# Patient Record
Sex: FEMALE | Race: WHITE
[De-identification: names, ages, dates, MRNs, and addresses within clinical notes are randomized per-mention and may not be internally consistent; named-entity substitution may affect disease eponyms.]

---

## 2016-10-21 VITALS — DIASTOLIC BLOOD PRESSURE: 61 MMHG | SYSTOLIC BLOOD PRESSURE: 126 MMHG

## 2017-01-17 ENCOUNTER — HOSPITAL ENCOUNTER (OUTPATIENT)
Dept: HOSPITAL 61 - US | Age: 22
Discharge: HOME | End: 2017-01-17
Attending: PEDIATRICS
Payer: COMMERCIAL

## 2017-01-17 DIAGNOSIS — R10.11: Primary | ICD-10-CM

## 2017-01-17 PROCEDURE — 76700 US EXAM ABDOM COMPLETE: CPT

## 2017-01-17 NOTE — RAD
Indication:Right upper quadrant abdominal pain



Grayscale images of the abdomen were obtained.



Comparison note is made of a prior examination 6/3/2015 reported as

unremarkable and a right upper quadrant abdominal ultrasound examination

8/28/2015 also reported as unremarkable



Liver:Normal.

Gallbladder:Normal. The common bile duct diameter of approximately 3 mm is

also normal.

Spleen:Normal

Pancreas:As visualized normal.

Kidneys:Normal.

Abdominal aorta and IVC:Normal

Ancillary findings:None



Impression:Normal abdominal ultrasound exam

## 2018-02-22 ENCOUNTER — HOSPITAL ENCOUNTER (EMERGENCY)
Dept: HOSPITAL 61 - ER | Age: 23
Discharge: HOME | End: 2018-02-22
Payer: COMMERCIAL
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
## 2018-02-22 DIAGNOSIS — Z91.041: ICD-10-CM

## 2018-02-22 DIAGNOSIS — S06.0X0D: Primary | ICD-10-CM

## 2018-02-22 PROCEDURE — 99284 EMERGENCY DEPT VISIT MOD MDM: CPT

## 2018-02-22 PROCEDURE — 70450 CT HEAD/BRAIN W/O DYE: CPT

## 2018-02-22 RX ADMIN — ONDANSETRON 1 MG: 4 TABLET, ORALLY DISINTEGRATING ORAL at 16:05

## 2020-02-10 ENCOUNTER — HOSPITAL ENCOUNTER (INPATIENT)
Dept: HOSPITAL 61 - ER | Age: 25
LOS: 1 days | Discharge: HOME | DRG: 125 | End: 2020-02-11
Attending: INTERNAL MEDICINE | Admitting: INTERNAL MEDICINE
Payer: COMMERCIAL

## 2020-02-10 VITALS — BODY MASS INDEX: 23.04 KG/M2 | HEIGHT: 66.5 IN | WEIGHT: 145.06 LBS

## 2020-02-10 DIAGNOSIS — F41.9: ICD-10-CM

## 2020-02-10 DIAGNOSIS — Z88.8: ICD-10-CM

## 2020-02-10 DIAGNOSIS — M25.50: ICD-10-CM

## 2020-02-10 DIAGNOSIS — G43.909: ICD-10-CM

## 2020-02-10 DIAGNOSIS — F32.9: ICD-10-CM

## 2020-02-10 DIAGNOSIS — H54.61: Primary | ICD-10-CM

## 2020-02-10 DIAGNOSIS — Q51.0: ICD-10-CM

## 2020-02-10 DIAGNOSIS — G35: ICD-10-CM

## 2020-02-10 DIAGNOSIS — Z82.49: ICD-10-CM

## 2020-02-10 LAB
ALBUMIN SERPL-MCNC: 3.8 G/DL (ref 3.4–5)
ALBUMIN/GLOB SERPL: 1.1 {RATIO} (ref 1–1.7)
ALP SERPL-CCNC: 97 U/L (ref 46–116)
ALT SERPL-CCNC: 29 U/L (ref 14–59)
ANION GAP SERPL CALC-SCNC: 10 MMOL/L (ref 6–14)
ANISOCYTOSIS BLD QL SMEAR: SLIGHT
AST SERPL-CCNC: 21 U/L (ref 15–37)
BASOPHILS # BLD AUTO: 0 X10^3/UL (ref 0–0.2)
BASOPHILS NFR BLD: 1 % (ref 0–3)
BILIRUB SERPL-MCNC: 0.2 MG/DL (ref 0.2–1)
BUN SERPL-MCNC: 19 MG/DL (ref 7–20)
BUN/CREAT SERPL: 24 (ref 6–20)
CALCIUM SERPL-MCNC: 9.6 MG/DL (ref 8.5–10.1)
CHLORIDE SERPL-SCNC: 103 MMOL/L (ref 98–107)
CO2 SERPL-SCNC: 27 MMOL/L (ref 21–32)
CREAT SERPL-MCNC: 0.8 MG/DL (ref 0.6–1)
CRP SERPL-MCNC: 4.2 MG/L (ref 0–3.3)
DACRYOCYTES BLD QL SMEAR: (no result)
EOSINOPHIL NFR BLD: 0.2 X10^3/UL (ref 0–0.7)
EOSINOPHIL NFR BLD: 3 % (ref 0–3)
ERYTHROCYTE [DISTWIDTH] IN BLOOD BY AUTOMATED COUNT: 13.6 % (ref 11.5–14.5)
GFR SERPLBLD BASED ON 1.73 SQ M-ARVRAT: 88.9 ML/MIN
GLOBULIN SER-MCNC: 3.5 G/DL (ref 2.2–3.8)
GLUCOSE SERPL-MCNC: 95 MG/DL (ref 70–99)
HCT VFR BLD CALC: 38.7 % (ref 36–47)
HGB BLD-MCNC: 12.8 G/DL (ref 12–15.5)
LYMPHOCYTES # BLD: 1.3 X10^3/UL (ref 1–4.8)
LYMPHOCYTES NFR BLD AUTO: 28 % (ref 24–48)
MCH RBC QN AUTO: 28 PG (ref 25–35)
MCHC RBC AUTO-ENTMCNC: 33 G/DL (ref 31–37)
MCV RBC AUTO: 84 FL (ref 79–100)
MONO #: 0.5 X10^3/UL (ref 0–1.1)
MONOCYTES NFR BLD: 10 % (ref 0–9)
NEUT #: 2.8 X10^3/UL (ref 1.8–7.7)
NEUTROPHILS NFR BLD AUTO: 58 % (ref 31–73)
PLATELET # BLD AUTO: 89 X10^3/UL (ref 140–400)
PLATELET # BLD EST: (no result) 10*3/UL
POTASSIUM SERPL-SCNC: 3.5 MMOL/L (ref 3.5–5.1)
PREG TEST PT QUAL: NEGATIVE
PROT SERPL-MCNC: 7.3 G/DL (ref 6.4–8.2)
RBC # BLD AUTO: 4.61 X10^6/UL (ref 3.5–5.4)
SODIUM SERPL-SCNC: 140 MMOL/L (ref 136–145)
WBC # BLD AUTO: 4.8 X10^3/UL (ref 4–11)

## 2020-02-10 PROCEDURE — 82607 VITAMIN B-12: CPT

## 2020-02-10 PROCEDURE — 80053 COMPREHEN METABOLIC PANEL: CPT

## 2020-02-10 PROCEDURE — 96360 HYDRATION IV INFUSION INIT: CPT

## 2020-02-10 PROCEDURE — 93005 ELECTROCARDIOGRAM TRACING: CPT

## 2020-02-10 PROCEDURE — 85379 FIBRIN DEGRADATION QUANT: CPT

## 2020-02-10 PROCEDURE — 70496 CT ANGIOGRAPHY HEAD: CPT

## 2020-02-10 PROCEDURE — 86038 ANTINUCLEAR ANTIBODIES: CPT

## 2020-02-10 PROCEDURE — 84484 ASSAY OF TROPONIN QUANT: CPT

## 2020-02-10 PROCEDURE — 85025 COMPLETE CBC W/AUTO DIFF WBC: CPT

## 2020-02-10 PROCEDURE — 70553 MRI BRAIN STEM W/O & W/DYE: CPT

## 2020-02-10 PROCEDURE — 86431 RHEUMATOID FACTOR QUANT: CPT

## 2020-02-10 PROCEDURE — 36415 COLL VENOUS BLD VENIPUNCTURE: CPT

## 2020-02-10 PROCEDURE — 70498 CT ANGIOGRAPHY NECK: CPT

## 2020-02-10 PROCEDURE — 84443 ASSAY THYROID STIM HORMONE: CPT

## 2020-02-10 PROCEDURE — 84703 CHORIONIC GONADOTROPIN ASSAY: CPT

## 2020-02-10 PROCEDURE — 86140 C-REACTIVE PROTEIN: CPT

## 2020-02-10 PROCEDURE — 71275 CT ANGIOGRAPHY CHEST: CPT

## 2020-02-10 PROCEDURE — G0378 HOSPITAL OBSERVATION PER HR: HCPCS

## 2020-02-10 NOTE — RAD
STUDY: CT angiography of the head and neck

 

INDICATION: Loss of vision.

 

COMPARISON: Correlation is made to the CT of the head from 2/22/2018

 

TECHNIQUE: Axial CT imaging of the head and neck utilizing angiography 

protocol and performed after the intravenous administration of 75 cc 

Omnipaque 300 contrast. Multiplanar reformats and 3D MIP acquisitions were

obtained. Encountered areas of stenosis are measured per NASCET criteria.

 

One or more of the following individualized dose reduction techniques were

utilized for this examination:  

 

1. Automated exposure control

2. Adjustment of the mA and/or kV according to patient size

3. Use of iterative reconstruction technique.

 

FINDINGS:

 

CTA NECK:

 

Arch/Proximal Great Vessels: The aortic arch and visualized ascending and 

descending aorta are normal. 3 vessel arch with widely patent origins. The

visualized subclavian and axillary arteries are normal. Widely patent 

common carotid arteries.

 

Carotid Bifurcation/Cervical ICA: Widely patent extracranial internal 

carotid arteries. Unremarkable external carotid artery branches.

 

Vertebral Arteries: Patent vertebral artery origins. The vertebral 

arteries are codominant and without flow-limiting stenosis or dissection 

throughout their extracranial course.

 

CTA HEAD:

 

Posterior Circulation: The right intracranial vertebral artery becomes 

small in caliber after the origin of the posterior inferior cerebellar 

artery but remains patent and this configuration is favored developmental.

The intracranial left vertebral artery is widely patent. The basilar 

artery is patent. Hypoplastic P1 posterior cerebral artery segments, more 

noticeable on the left, in the setting of bilateral widely patent 

posterior communicating arteries. No branch vessel occlusion or 

flow-limiting stenosis seen to involve the bilateral posterior cerebral 

arteries distal to P1.

 

Anterior Circulation: Normal intracranial carotid arteries. No 

flow-limiting stenosis, branch vessel occlusion or aneurysm seen to 

involve the anterior circulation. Variant anterior cerebral artery anatomy

with a patent anterior communicating artery but predominant supply of the 

right and left anterior cerebral artery branches distal to A1 by a 

dominant left A2 and beyond anterior cerebral artery vessel. Ophthalmic 

arteries remain opacified bilaterally.

 

Veins: Patent dural sinuses and major intracerebral veins.

 

MISCELLANEOUS:

 

Unremarkable orbits no abnormality involving the visualized lungs. 

Unremarkable cervical spine.

 

IMPRESSION: 

 

CT Angio Neck:

1.  The visualized aorta is normal. Widely patent carotid and vertebral 

arteries throughout their extracranial course.

 

CT Angio Head:

1. No flow-limiting stenosis or branch vessel occlusion involving the 

anterior or posterior cerebral circulations. Patent dural sinuses and 

major intracerebral veins. Collectively, no abnormality is seen by CT 

angiography to account for the patient's vision loss.

 

Electronically signed by: SULTANA CHOW MD (2/10/2020 9:12 PM) [id number]

## 2020-02-10 NOTE — RAD
Exam: CT of chest with contrast

 

INDICATION: Loss of vision

 

TECHNIQUE: Sequential axial images through the chest obtained following 

the administration 90 mL of Omni 350 IV contrast. Sagittal and coronal 

reformatted images were reconstructed from the axial data and reviewed. 

3-D reformatted images were reconstructed from the axial data and 

reviewed.

 

Comparisons: None

 

FINDINGS:

Visualized portions of the thyroid are unremarkable. No enlarged 

mediastinal lymph nodes.

 

Heart size is normal. No pericardial effusion. Thoracic aorta has a normal

course and caliber. Pulmonary artery is not enlarged.

 

Airways are patent. No consolidation or pneumothorax. No suspicious lung 

nodules are identified.

 

No pleural effusion or thickening.

 

Visualized upper abdomen is unremarkable.

 

No suspicious osseous lesions or acute fracture. Sternotomy wires are 

noted.

 

IMPRESSION:

Thoracic aorta has a normal course and caliber without evidence of 

dissection, aneurysm or intramural hematoma.

 

Exposure: One or more of the following in the visualized dose reduction 

techniques were utilized for this examination:

1.  Automated exposure control

2.  Adjustment of the MA and/or KV according to patient size

3.  Use of iterative of reconstructive technique

 

Electronically signed by: Lizbeth Valle MD (2/10/2020 10:05 PM) [id number]

## 2020-02-11 VITALS — DIASTOLIC BLOOD PRESSURE: 73 MMHG | SYSTOLIC BLOOD PRESSURE: 114 MMHG

## 2020-02-11 VITALS — DIASTOLIC BLOOD PRESSURE: 62 MMHG | SYSTOLIC BLOOD PRESSURE: 105 MMHG

## 2020-02-11 VITALS — DIASTOLIC BLOOD PRESSURE: 54 MMHG | SYSTOLIC BLOOD PRESSURE: 99 MMHG

## 2020-02-11 LAB — RHEUMATOID FACT SERPL-ACNC: 322.6 IU/ML (ref 0–13.9)

## 2020-02-11 RX ADMIN — MORPHINE SULFATE PRN MG: 2 INJECTION, SOLUTION INTRAMUSCULAR; INTRAVENOUS at 03:07

## 2020-02-11 RX ADMIN — MORPHINE SULFATE PRN MG: 2 INJECTION, SOLUTION INTRAMUSCULAR; INTRAVENOUS at 08:00

## 2020-02-11 NOTE — PDOC2
NEUROLOGY CONSULT


Date of Admission


Date of Admission


DATE: 2/11/20 


TIME: 10:14





Reason for Consult


Reason for Consult:


Vision loss





Referring Physician


Referring Physician:


Dr. Luana Clement





Source


Source:  Caregiver, Chart review, Patient





History of Present Illness


History of Present Illness


The patient is a 24-year-old right-handed female who noticed yesterday the onset

of blurred vision just in the lateral field of the right eye. She has had no 

loss of vision a left eye. She does have a history of migraines in the past but 

not currently. She denies any head pain including with eye movement. She is 

having chest pain, which she has had in the past and has had negative workup 

for. She has had poly arthralgia for the last several months and has difficulty 

getting around. She is scheduled to see her eye doctor today. There is no histor

y of stroke, seizure, or head injury.She denies any psychosocial stress.





Past Medical History


Cardiovascular:  Valve insufficiency


Psych:  Anxiety, Depression


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





Past Surgical History


Past Surgical History:  Other (Mitral valve repair as an infant, strabismus 

surgery)





Family History


Family History:  No pertinent hx





Social History


Social History


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





Current Medications


Current Medications





Current Medications


Iohexol (Omnipaque 300 Mg/ml) 75 ml 1X  ONCE IV  Last administered on 2/10/20at 

21:26;  Start 2/10/20 at 21:00;  Stop 2/10/20 at 21:02;  Status DC


Info (CONTRAST GIVEN -- Rx MONITORING) 1 each PRN DAILY  PRN MC SEE COMMENTS;  

Start 2/10/20 at 21:15;  Stop 2/12/20 at 21:14


Sodium Chloride 1,000 ml @  1,000 mls/hr 1X  ONCE IV  Last administered on 

2/10/20at 22:19;  Start 2/10/20 at 21:30;  Stop 2/10/20 at 22:29;  Status DC


Iohexol (Omnipaque 350 Mg/ml) 90 ml 1X  ONCE IV  Last administered on 2/10/20at 

22:00;  Start 2/10/20 at 21:45;  Stop 2/10/20 at 21:47;  Status DC


Info (CONTRAST GIVEN -- Rx MONITORING) 1 each PRN DAILY  PRN MC SEE COMMENTS;  

Start 2/10/20 at 22:00;  Stop 2/12/20 at 21:59


Morphine Sulfate (Morphine Sulfate) 2 mg PRN Q3HRS  PRN IV PAIN Last adminis

tered on 2/11/20at 08:00;  Start 2/11/20 at 03:00


Ondansetron HCl (Zofran) 4 mg PRN Q8HRS  PRN IV NAUSEA/VOMITING;  Start 2/11/20 

at 06:15;  Stop 2/12/20 at 06:14





Active Scripts


Active


Zofran Odt (Ondansetron) 4 Mg Tab.rapdis 1 Tab SL Q8HRS


Reported


Premarin (Estrogens, Conjugated) 1.25 Mg Tablet 1.25 Mg PO DAILY





Allergies


Allergies:  


Coded Allergies:  


     povidone-iodine (Verified  Allergy, Intermediate, 2/11/20)





ROS


Review of System


Negative for fever, chills, weight loss, shortness of breath, chest pain, 

indigestion, hematochezia, melena, and dysuria.  Full 14-point review of systems

is negative.





Physical Exam


Physical Examination


General:


Well-developed, well-nourished white female in no acute distress


HEENT:


Normocephalic andatraumatic. Temporal arteriespulsatile and 

nontender.Fundoscopic exam unremarkable


Neck:


Supple without bruit, no meningismus


Musculoskeletal:


Stability:see neurologic. Gait exam:see neurologic. Tone:see 

neurologic.Strength:see neurologic.


Neurological:


Mental Status:intact, orientation, memory, attention span/concentration, 

language, fund of knowledge normal. Cranial Nerves:Pupils equal and reactive to

light, extraocular movements areintact, visual fields are full to 

confrontation. Facial sensation is normal. There is no facial asymmetry. 

Vestibulo-ocular reflex is intact. Palate elevates and tongue protrudes in m

idline. All other cranial related problems are negative except as mentioned 

before.Reflexes:2+ and symmetric with flexor plantar responses. Motor:5/5 

strength with normal tone and bulk. Coordination:Finger-nose finger and 

heel-to-shin testing are normal. Rapid alternating movements and fine finger 

movements are intact. Gait: antalgic, but able to tandem. Sensory:Normal 

pinprick, vibration, light touch, proprioception.





Vitals


VITALS





Vital Signs








  Date Time  Temp Pulse Resp B/P (MAP) Pulse Ox O2 Delivery O2 Flow Rate FiO2


 


2/11/20 08:00      Room Air  


 


2/11/20 07:00 97.9 66 18 105/62 (76) 98   





 97.9       











Labs


Labs





Laboratory Tests








Test


 2/10/20


18:31 2/11/20


08:40


 


White Blood Count


 4.8 x10^3/uL


(4.0-11.0) 





 


Red Blood Count


 4.61 x10^6/uL


(3.50-5.40) 





 


Hemoglobin


 12.8 g/dL


(12.0-15.5) 





 


Hematocrit


 38.7 %


(36.0-47.0) 





 


Mean Corpuscular Volume 84 fL ()  


 


Mean Corpuscular Hemoglobin 28 pg (25-35)  


 


Mean Corpuscular Hemoglobin


Concent 33 g/dL


(31-37) 





 


Red Cell Distribution Width


 13.6 %


(11.5-14.5) 





 


Platelet Count


 89 x10^3/uL


(140-400) 





 


Neutrophils (%) (Auto) 58 % (31-73)  


 


Lymphocytes (%) (Auto) 28 % (24-48)  


 


Monocytes (%) (Auto) 10 % (0-9)  


 


Eosinophils (%) (Auto) 3 % (0-3)  


 


Basophils (%) (Auto) 1 % (0-3)  


 


Neutrophils # (Auto)


 2.8 x10^3/uL


(1.8-7.7) 





 


Lymphocytes # (Auto)


 1.3 x10^3/uL


(1.0-4.8) 





 


Monocytes # (Auto)


 0.5 x10^3/uL


(0.0-1.1) 





 


Eosinophils # (Auto)


 0.2 x10^3/uL


(0.0-0.7) 





 


Basophils # (Auto)


 0.0 x10^3/uL


(0.0-0.2) 





 


Platelet Estimate


 Decreased


(ADEQUATE) 





 


Large Platelets Occ  


 


Anisocytosis Slight  


 


Tear Drop Cells Occ  


 


D-Dimer (Arianne)


 9.18 ug/mlFEU


(0.00-0.50) 





 


Sodium Level


 140 mmol/L


(136-145) 





 


Potassium Level


 3.5 mmol/L


(3.5-5.1) 





 


Chloride Level


 103 mmol/L


() 





 


Carbon Dioxide Level


 27 mmol/L


(21-32) 





 


Anion Gap 10 (6-14)  


 


Blood Urea Nitrogen


 19 mg/dL


(7-20) 





 


Creatinine


 0.8 mg/dL


(0.6-1.0) 





 


Estimated GFR


(Cockcroft-Gault) 88.9 


 





 


BUN/Creatinine Ratio 24 (6-20)  


 


Glucose Level


 95 mg/dL


(70-99) 





 


Calcium Level


 9.6 mg/dL


(8.5-10.1) 





 


Total Bilirubin


 0.2 mg/dL


(0.2-1.0) 





 


Aspartate Amino Transf


(AST/SGOT) 21 U/L (15-37) 


 





 


Alanine Aminotransferase


(ALT/SGPT) 29 U/L (14-59) 


 





 


Alkaline Phosphatase


 97 U/L


() 





 


C-Reactive Protein,


Quantitative 4.2 mg/L


(0-3.3) 





 


Total Protein


 7.3 g/dL


(6.4-8.2) 





 


Albumin


 3.8 g/dL


(3.4-5.0) 





 


Albumin/Globulin Ratio 1.1 (1.0-1.7)  


 


Serum Pregnancy Test,


Qualitative Negative (NEG) 


 





 


Troponin I Quantitative


 


 < 0.017 ng/mL


(0.000-0.055)


 


Vitamin B12 Level


 


 513 pg/mL


(247-911)


 


Thyroid Stimulating Hormone


(TSH) 


 3.785 uIU/mL


(0.358-3.74)








Laboratory Tests








Test


 2/10/20


18:31 2/11/20


08:40


 


White Blood Count


 4.8 x10^3/uL


(4.0-11.0) 





 


Red Blood Count


 4.61 x10^6/uL


(3.50-5.40) 





 


Hemoglobin


 12.8 g/dL


(12.0-15.5) 





 


Hematocrit


 38.7 %


(36.0-47.0) 





 


Mean Corpuscular Volume 84 fL ()  


 


Mean Corpuscular Hemoglobin 28 pg (25-35)  


 


Mean Corpuscular Hemoglobin


Concent 33 g/dL


(31-37) 





 


Red Cell Distribution Width


 13.6 %


(11.5-14.5) 





 


Platelet Count


 89 x10^3/uL


(140-400) 





 


Neutrophils (%) (Auto) 58 % (31-73)  


 


Lymphocytes (%) (Auto) 28 % (24-48)  


 


Monocytes (%) (Auto) 10 % (0-9)  


 


Eosinophils (%) (Auto) 3 % (0-3)  


 


Basophils (%) (Auto) 1 % (0-3)  


 


Neutrophils # (Auto)


 2.8 x10^3/uL


(1.8-7.7) 





 


Lymphocytes # (Auto)


 1.3 x10^3/uL


(1.0-4.8) 





 


Monocytes # (Auto)


 0.5 x10^3/uL


(0.0-1.1) 





 


Eosinophils # (Auto)


 0.2 x10^3/uL


(0.0-0.7) 





 


Basophils # (Auto)


 0.0 x10^3/uL


(0.0-0.2) 





 


Platelet Estimate


 Decreased


(ADEQUATE) 





 


Large Platelets Occ  


 


Anisocytosis Slight  


 


Tear Drop Cells Occ  


 


D-Dimer (Arianne)


 9.18 ug/mlFEU


(0.00-0.50) 





 


Sodium Level


 140 mmol/L


(136-145) 





 


Potassium Level


 3.5 mmol/L


(3.5-5.1) 





 


Chloride Level


 103 mmol/L


() 





 


Carbon Dioxide Level


 27 mmol/L


(21-32) 





 


Anion Gap 10 (6-14)  


 


Blood Urea Nitrogen


 19 mg/dL


(7-20) 





 


Creatinine


 0.8 mg/dL


(0.6-1.0) 





 


Estimated GFR


(Cockcroft-Gault) 88.9 


 





 


BUN/Creatinine Ratio 24 (6-20)  


 


Glucose Level


 95 mg/dL


(70-99) 





 


Calcium Level


 9.6 mg/dL


(8.5-10.1) 





 


Total Bilirubin


 0.2 mg/dL


(0.2-1.0) 





 


Aspartate Amino Transf


(AST/SGOT) 21 U/L (15-37) 


 





 


Alanine Aminotransferase


(ALT/SGPT) 29 U/L (14-59) 


 





 


Alkaline Phosphatase


 97 U/L


() 





 


C-Reactive Protein,


Quantitative 4.2 mg/L


(0-3.3) 





 


Total Protein


 7.3 g/dL


(6.4-8.2) 





 


Albumin


 3.8 g/dL


(3.4-5.0) 





 


Albumin/Globulin Ratio 1.1 (1.0-1.7)  


 


Serum Pregnancy Test,


Qualitative Negative (NEG) 


 





 


Troponin I Quantitative


 


 < 0.017 ng/mL


(0.000-0.055)


 


Vitamin B12 Level


 


 513 pg/mL


(247-911)


 


Thyroid Stimulating Hormone


(TSH) 


 3.785 uIU/mL


(0.358-3.74)











Images


Images


CT angiography of the head and neck


 


INDICATION: Loss of vision.


 


COMPARISON: Correlation is made to the CT of the head from 2/22/2018


 


TECHNIQUE: Axial CT imaging of the head and neck utilizing angiography 


protocol and performed after the intravenous administration of 75 cc 


Omnipaque 300 contrast. Multiplanar reformats and 3D MIP acquisitions were


obtained. Encountered areas of stenosis are measured per NASCET criteria.


 


One or more of the following individualized dose reduction techniques were


utilized for this examination:  


 


1. Automated exposure control


2. Adjustment of the mA and/or kV according to patient size


3. Use of iterative reconstruction technique.


 


FINDINGS:


 


CTA NECK:


 


Arch/Proximal Great Vessels: The aortic arch and visualized ascending and 


descending aorta are normal. 3 vessel arch with widely patent origins. The


visualized subclavian and axillary arteries are normal. Widely patent 


common carotid arteries.


 


Carotid Bifurcation/Cervical ICA: Widely patent extracranial internal 


carotid arteries. Unremarkable external carotid artery branches.


 


Vertebral Arteries: Patent vertebral artery origins. The vertebral 


arteries are codominant and without flow-limiting stenosis or dissection 


throughout their extracranial course.


 


CTA HEAD:


 


Posterior Circulation: The right intracranial vertebral artery becomes 


small in caliber after the origin of the posterior inferior cerebellar 


artery but remains patent and this configuration is favored developmental.


The intracranial left vertebral artery is widely patent. The basilar 


artery is patent. Hypoplastic P1 posterior cerebral artery segments, more 


noticeable on the left, in the setting of bilateral widely patent 


posterior communicating arteries. No branch vessel occlusion or 


flow-limiting stenosis seen to involve the bilateral posterior cerebral 


arteries distal to P1.


 


Anterior Circulation: Normal intracranial carotid arteries. No 


flow-limiting stenosis, branch vessel occlusion or aneurysm seen to 


involve the anterior circulation. Variant anterior cerebral artery anatomy


with a patent anterior communicating artery but predominant supply of the 


right and left anterior cerebral artery branches distal to A1 by a 


dominant left A2 and beyond anterior cerebral artery vessel. Ophthalmic 


arteries remain opacified bilaterally.


 


Veins: Patent dural sinuses and major intracerebral veins.


 


MISCELLANEOUS:


 


Unremarkable orbits no abnormality involving the visualized lungs. 


Unremarkable cervical spine.


 


IMPRESSION: 


 


CT Angio Neck:


1.  The visualized aorta is normal. Widely patent carotid and vertebral 


arteries throughout their extracranial course.


 


CT Angio Head:


1. No flow-limiting stenosis or branch vessel occlusion involving the 


anterior or posterior cerebral circulations. Patent dural sinuses and 


major intracerebral veins. Collectively, no abnormality is seen by CT 


angiography to account for the patient's vision loss.





Assessment/Plan


Assessment/Plan


Impression:


Unilateral right-eye  peripheral field defect, this would be unusual to be due 

to a neurological issue such as multiple sclerosis, optic neuritis, or cereb

rovascular disease. I also find no evidence of intraocular pathology. Migraine 

phenomenon would be an attractive explanation for this issue.


Polyarthralgia, elevated C-reactive protein


History of psychiatric disease, not apparent currently.





Recommendations:


Brain MRI with and without contrast, further recommendations depending on 

results


Laboratory studies for rheumatologic disease


Outpatient rheumatology consultation


outpatient ophthalmology consultation, she is scheduled to see her optometrist 

today.


Reassurance offered that migraine is the most likely cause of this issue.


Follow up with me and 4-6 weeks.


Aim for discharge later today


Also discussed with patient's mother.





Thank you for letting me help of the patient's care.











CAMILLE BACH MD           Feb 11, 2020 10:22

## 2020-02-11 NOTE — RAD
BRAIN WO/W CONTRAST 

 

Date: 2/11/2020 8:44 AM 

 

Indication:  Right eye blurred vision 

 

Comparison:  CT 2/10/2020. 

 

Technique: Multiplanar multisequence MRI of the brain was performed with 

and without intravenous contrast using the standard protocol. 13 cc 

Dotarem contrast was administered intravenously during the exam. 

 

Findings: 

 

No acute infarct. No acute or chronic hemorrhage. The ventricles are 

normal in size and configuration without hydrocephalus. Small right 

parietal periventricular neuroglial cyst or perivascular space.

 

No abnormal enhancement. There are a couple of T2 hyperintense foci in the

right cerebellum.

 

The scalp and calvarium are normal. The pituitary and sella are normal. No

Chiari malformation. The visualized upper cervical spine is normal.

 

The visualized orbits and globes are normal. The visualized paranasal 

sinuses are clear. The mastoid air cells are clear.

 

Normal flow voids within the vertebral, basilar, and internal carotid 

arteries indicating patency.

 

IMPRESSION:

1. No acute infarct or hemorrhage. No mass or abnormal enhancement.

 

2. There are a couple of T2 hyperintense foci in the right cerebellum, 

nonspecific but possibly perivascular spaces. Chronic lacunar infarcts 

would be uncommon in a patient of this age.

 

Electronically signed by: Theodore Grimm MD (2/11/2020 2:54 PM) [id number]

## 2020-02-11 NOTE — EKG
[facility]

              [address] [place] [address]

Test Date:    2020-02-10               Test Time:    18:29:05

Pat Name:     BRIAN ESTRELLA          Department:   

Patient ID:   [id number]           Room:          

Gender:       F                        Technician:   

:          [id number]               Requested By: DAVID BARRY

Order Number: [id number]           Reading MD:     

                                 Measurements

Intervals                              Axis          

Rate:         69                       P:            12

MT:           170                      QRS:          -41

QRSD:         98                       T:            13

QT:           406                                    

QTc:          441                                    

                           Interpretive Statements

SINUS RHYTHM

ABNORMAL LEFT AXIS DEVIATION

R-S TRANSITION ZONE IN V LEADS DISPLACED TO THE RIGHT

LEFT ANTERIOR FASCICULAR BLOCK

INCOMPLETE RIGHT BUNDLE BRANCH BLOCK

RVH WITH REPOLARIZATION ABNORMALITY

ABNORMAL ECG

No previous ECG available for comparison

## 2020-02-11 NOTE — SSS
ADMIT DATE:  02/11/2020



CHIEF COMPLAINT:  Vision loss.



HISTORY OF PRESENT ILLNESS:  The patient is a pleasant, relatively healthy

24-year-old female who presented with vision loss.  This came on a few hours

ago, rated at 6/10.  She has associated chest pain and anxiety.  Moving makes it

worse and sitting still makes it better, described as irritating.  She tried

taking some home meds, but that did not help.  I discussed the case with ER

physician.  We are going to admit the patient and consult Ophthalmology.  I did

call them today.  They do not come to the hospital right now, so we are going to

probably discharge the patient and let her go over to see them next door (it

should be noted that her vision changes have improved dramatically.  This

morning, she is back to her baseline other than some slight fuzziness on the

right eye).



PAST MEDICAL HISTORY:  Anxiety.



ALLERGIES:  None.



FAMILY HISTORY:  Hypertension.



SOCIAL HISTORY:  She does not drink, smoke or take drugs.  She has a job.



MEDICATIONS:  Reviewed, please refer to the MRAD.



REVIEW OF SYSTEMS:

GENERAL:  No history of weight change, weakness or fevers.

SKIN:  No bruising, hair changes or rashes.

EYES:  No blurred, double or loss of vision.

NOSE AND THROAT:  No history of nosebleeds, hoarseness or sore throat.

HEART:  No history of palpitations, chest pain or shortness of breath on

exertion.

LUNGS:  Denies cough, hemoptysis, wheezing or shortness of breath.

GASTROINTESTINAL:  Denies changes in appetite, nausea, vomiting, diarrhea or

constipation.

GENITOURINARY:  No history of frequency, urgency, hesitancy or nocturia.

NEUROLOGIC:  She complains of visual changes.

PSYCHIATRIC:  No history of panic, anxiety or depression.

ENDOCRINE:  No history of heat or cold intolerance, polyuria or polydipsia.

EXTREMITIES:  Denies muscle weakness, joint pain, pain on walking or stiffness.



PHYSICAL EXAMINATION:

VITALS:  Within normal limits and are stable.

GENERAL:  No apparent distress.  Alert and oriented.

HEENT:  Normal cephalic atraumatic, external auditory canals are patent.

EYES:  Extraocular muscles are intact, pupils are equally round and reactive to

light and accommodation.

MUSKULOSKELETAL:  Well developed, well nourished, good range of motion.

ENDOCRINE:  No thyromegaly was palpated.

LYMPHATICS:  No cervical chain or axillary nodes were noted.

HEMATOPOIETIC:  No bruising.

NECK:  Supple, no JVD, no thyromegaly was noted.

LUNGS:  Clear to auscultation in all lung fields without rhonchi or wheezing.

HEART:  RRR, S1, S2 present.  Peripheral pulses intact, no obvious murmurs were

noted.

ABDOMEN:  Soft, nontender.  Positive bowel sounds no organomegaly, normal bowel

sounds.

EXTREMITIES:  Without any cyanosis, clubbing, or edema.  Pedal pulses intact,

Homans sign is negative.

NEUROLOGIC:  Normal speech, normal tone.  A & O x3, moves all extremities, no

obvious focal deficits.

PSYCHIATRIC:  Normal affect, normal mood.  Stable.

SKIN:  No ulcerations or rashes, good skin turgor, no jaundice.

VASCULAR:  Good capillary refill, neurovascular bundle appears to be intact.



ASSESSMENT AND PLAN:  Resolving visual changes.  The patient is going to be

discharged.  We did consult Neurology.  They ordered an MRI, so if the MRI is

negative, we plan to discharge and she is going to walk next door with her

parents to the Ophthalmology office and get a full exam there.



DISPOSITION:  Home.



ACTIVITY:  As tolerated.



DIET:  Low sodium.



MEDICATIONS:  Please see MRAD.



TOTAL TIME:  32 minutes.

 



______________________________

BELTRAN OLMEDO DO



DR:  SARBJIT/sudha  JOB#:  [id number] / [id number]

DD:  02/11/2020 12:09  DT:  02/11/2020 12:17

## 2020-02-11 NOTE — NUR
Discharge Note:



[facility]



Discharge instructions and discharge home medications reviewed with Patient and a copy 
given. All questions have been answered and understanding verbalized. 



The following instructions and handouts were given: CP, blurred vision, d-dimer, follow up 
with Dr. Gonsalez and Dr. Benitez



Discontinued lines and drains: Peripheral IV intact.



Patient discharged to Home or Self Care with Family Member via Wheelchair

## 2020-02-11 NOTE — PHYS DOC
Past Medical History


Past Medical History:  Other


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 

UTERUS"


Past Surgical History:  Other


Additional Past Surgical Histo:  AV VALVE DEFECT W/MITRAL VALVE DEFICIENCY 

SURG,EYE SURG,BREAST AUGMENTATION


Smoking Status:  Never Smoker


Alcohol Use:  Occasionally


Drug Use:  None





Adult General


Chief Complaint


Chief Complaint:  VISION PROBLEM





[facility]


HPI


Patient is a 24  year old female who presents with sharp peristernal chest pain 

and loss of peripheral vision of right eye. Symptom onset was 2 hours prior to 

ED arrival. Patient denies posterior neck pain, tinnitus, dizziness. No 

palpitations, shortness of breath. No history of migraines. Denies flashes, 

floaters or loss or eye pain. Wears corrective lenses. No other acute symptoms 

or complaints. History of mitral valve repair and strabismus. []





Review of Systems


Review of Systems





Constitutional: Denies fever or chills []


Eyes: Denies change in visual acuity, redness, or eye pain []


HENT: Denies nasal congestion or sore throat []


Respiratory: Denies cough or shortness of breath []


Cardiovascular: No additional information not addressed in HPI []


GI: Denies abdominal pain, nausea, vomiting, bloody stools or diarrhea []


: Denies dysuria or hematuria []


Musculoskeletal: Denies back pain or joint pain []


Integument: Denies rash or skin lesions []


Neurologic: Denies headache, focal weakness or sensory changes []


Endocrine: Denies polyuria or polydipsia []





All other systems were reviewed and found to be within normal limits, except as 

documented in this note.





Current Medications


Current Medications





Current Medications








 Medications


  (Trade)  Dose


 Ordered  Sig/Gretel  Start Time


 Stop Time Status Last Admin


Dose Admin


 


 Info


  (CONTRAST GIVEN


 -- Rx MONITORING)  1 each  PRN DAILY  PRN  2/10/20 22:00


 2/12/20 21:59   





 


 Iohexol


  (Omnipaque 300


 Mg/ml)  75 ml  1X  ONCE  2/10/20 21:00


 2/10/20 21:02 DC 2/10/20 21:26


75 ML


 


 Iohexol


  (Omnipaque 350


 Mg/ml)  90 ml  1X  ONCE  2/10/20 21:45


 2/10/20 21:47 DC 2/10/20 22:00


90 ML


 


 Sodium Chloride  1,000 ml @ 


 1,000 mls/hr  1X  ONCE  2/10/20 21:30


 2/10/20 22:29 DC 2/10/20 22:19


1,000 MLS/HR











Physical Exam


Physical Exam





Constitutional: Well developed, well nourished, no acute distress, non-toxic 

appearance. []


HENT: Normocephalic, atraumatic, bilateral external ears normal, oropharynx 

moist, no oral exudates, nose normal. []


Eyes: PERRLA, EOMI, conjunctiva normal. Visual acuity, 20/20, 20/20, no acute 

abnormalities appreciated on limited funduscopic exam. [] 


Neck: Normal range of motion, no tenderness, supple, no stridor. [] 


Cardiovascular:Heart rate regular rhythm, no murmur []


Lungs & Thorax:  Bilateral breath sounds clear to auscultation []


Abdomen: Bowel sounds normal, soft, no tenderness, no masses, no pulsatile 

masses. [] 


Skin: Warm, dry, no erythema, no rash. [] 


Back: No tenderness, no CVA tenderness. [] 


Extremities: No tenderness, no cyanosis, no clubbing, ROM intact, no edema. [] 


Neurologic: Alert and oriented X 3, CN 3-12 grossly intact, loss of right eye 

lateral peripheral vision, normal motor function, normal sensory function, no 

focal deficits noted. []


Psychologic: Affect normal, judgement normal, mood normal. []





Current Patient Data


Vital Signs





                                   Vital Signs








  Date Time  Temp Pulse Resp B/P (MAP) Pulse Ox O2 Delivery O2 Flow Rate FiO2


 


2/10/20 23:17  54  112/71 (85) 99 Room Air  


 


2/10/20 18:07 98.1  17     





 98.1       








Lab Values





                                Laboratory Tests








Test


 2/10/20


18:31


 


White Blood Count


 4.8 x10^3/uL


(4.0-11.0)


 


Red Blood Count


 4.61 x10^6/uL


(3.50-5.40)


 


Hemoglobin


 12.8 g/dL


(12.0-15.5)


 


Hematocrit


 38.7 %


(36.0-47.0)


 


Mean Corpuscular Volume


 84 fL ()





 


Mean Corpuscular Hemoglobin 28 pg (25-35)  


 


Mean Corpuscular Hemoglobin


Concent 33 g/dL


(31-37)


 


Red Cell Distribution Width


 13.6 %


(11.5-14.5)


 


Platelet Count


 89 x10^3/uL


(140-400)  L


 


Neutrophils (%) (Auto) 58 % (31-73)  


 


Lymphocytes (%) (Auto) 28 % (24-48)  


 


Monocytes (%) (Auto) 10 % (0-9)  H


 


Eosinophils (%) (Auto) 3 % (0-3)  


 


Basophils (%) (Auto) 1 % (0-3)  


 


Neutrophils # (Auto)


 2.8 x10^3/uL


(1.8-7.7)


 


Lymphocytes # (Auto)


 1.3 x10^3/uL


(1.0-4.8)


 


Monocytes # (Auto)


 0.5 x10^3/uL


(0.0-1.1)


 


Eosinophils # (Auto)


 0.2 x10^3/uL


(0.0-0.7)


 


Basophils # (Auto)


 0.0 x10^3/uL


(0.0-0.2)


 


Platelet Estimate


 Decreased


(ADEQUATE)


 


Large Platelets Occ  


 


Anisocytosis Slight  


 


Tear Drop Cells Occ  


 


D-Dimer (Arianne)


 9.18 ug/mlFEU


(0.00-0.50)  H


 


Sodium Level


 140 mmol/L


(136-145)


 


Potassium Level


 3.5 mmol/L


(3.5-5.1)


 


Chloride Level


 103 mmol/L


()


 


Carbon Dioxide Level


 27 mmol/L


(21-32)


 


Anion Gap 10 (6-14)  


 


Blood Urea Nitrogen


 19 mg/dL


(7-20)


 


Creatinine


 0.8 mg/dL


(0.6-1.0)


 


Estimated GFR


(Cockcroft-Gault) 88.9  





 


BUN/Creatinine Ratio 24 (6-20)  H


 


Glucose Level


 95 mg/dL


(70-99)


 


Calcium Level


 9.6 mg/dL


(8.5-10.1)


 


Total Bilirubin


 0.2 mg/dL


(0.2-1.0)


 


Aspartate Amino Transferase


(AST) 21 U/L (15-37)





 


Alanine Aminotransferase (ALT)


 29 U/L (14-59)





 


Alkaline Phosphatase


 97 U/L


()


 


C-Reactive Protein,


Quantitative 4.2 mg/L


(0-3.3)  H


 


Total Protein


 7.3 g/dL


(6.4-8.2)


 


Albumin


 3.8 g/dL


(3.4-5.0)


 


Albumin/Globulin Ratio 1.1 (1.0-1.7)  


 


Serum Pregnancy Test,


Qualitative Negative (NEG)








                                Laboratory Tests


2/10/20 18:31








                                Laboratory Tests


2/10/20 18:31














EKG


EKG


[EKG: reviewed]





Radiology/Procedures


Radiology/Procedures


[CT Angio head/neck/chest: No acute disease per radiology report. 





]





Course & Med Decision Making


Course & Med Decision Making


Pertinent Labs and Imaging studies reviewed. (See chart for details)





[Patient with loss of right eye peripheral vision atypical chest pain. Elevated 

d-dimer, etiology unclear. CT angio head neck chest negative. Case reviewed with

 Dr. Louise. Will admit to the hospitalist service with arrangements for the 

patient to be evaluated in Dr. Sanders's office tomorrow during the day.]





Dragon Disclaimer


Dragon Disclaimer


This electronic medical record was generated, in whole or in part, using a voice

recognition dictation system.





Departure


Departure


Impression:  


   Primary Impression:  


   Vision loss of right eye


   Additional Impressions:  


   Chest pain


   Elevated d-dimer


Disposition:  09 ADMITTED AS INPATIENT


Condition:  STABLE


Referrals:  


NO PCP (PCP)





Problem Qualifiers











DAVID BARRY DO                   Feb 11, 2020 06:01